# Patient Record
Sex: FEMALE
[De-identification: names, ages, dates, MRNs, and addresses within clinical notes are randomized per-mention and may not be internally consistent; named-entity substitution may affect disease eponyms.]

---

## 2022-09-26 ENCOUNTER — APPOINTMENT (OUTPATIENT)
Dept: HEART AND VASCULAR | Facility: CLINIC | Age: 32
End: 2022-09-26

## 2022-09-26 PROBLEM — Z00.00 ENCOUNTER FOR PREVENTIVE HEALTH EXAMINATION: Status: ACTIVE | Noted: 2022-09-26

## 2023-06-12 ENCOUNTER — OFFICE (OUTPATIENT)
Dept: URBAN - METROPOLITAN AREA CLINIC 28 | Facility: CLINIC | Age: 33
Setting detail: OPHTHALMOLOGY
End: 2023-06-12
Payer: COMMERCIAL

## 2023-06-12 ENCOUNTER — RX ONLY (RX ONLY)
Age: 33
End: 2023-06-12

## 2023-06-12 DIAGNOSIS — H01.004: ICD-10-CM

## 2023-06-12 DIAGNOSIS — H01.001: ICD-10-CM

## 2023-06-12 DIAGNOSIS — H10.45: ICD-10-CM

## 2023-06-12 PROCEDURE — 92004 COMPRE OPH EXAM NEW PT 1/>: CPT | Performed by: OPHTHALMOLOGY

## 2023-06-12 ASSESSMENT — TONOMETRY
OS_IOP_MMHG: 14
OD_IOP_MMHG: 15

## 2023-06-12 ASSESSMENT — LID EXAM ASSESSMENTS
OD_BLEPHARITIS: RUL 1+
OS_BLEPHARITIS: LUL 1+

## 2023-06-12 ASSESSMENT — CONFRONTATIONAL VISUAL FIELD TEST (CVF)
OD_FINDINGS: FULL
OS_FINDINGS: FULL

## 2023-06-13 PROBLEM — H10.45 ALLERGIC CONJUNCTIVITIS: Status: ACTIVE | Noted: 2023-06-12

## 2023-06-13 ASSESSMENT — VISUAL ACUITY
OS_BCVA: 20/20
OD_BCVA: 20/20

## 2023-09-10 NOTE — ASSESSMENT
[FreeTextEntry1] : DIONICIO HURST is a 33 year old female with left knee pain. I discussed with the patient that their symptoms, signs, and imaging are most consistent with ***. We reviewed the natural history of this condition and treatment options ranging from conservative measures (activity modification, physical therapy, icing, oral anti-inflammatory and/or analgesic medications, steroid injection, HA gel injections, PRP injections) to surgical management. We agreed on the following plan:   XR taken and reviewed with patient today. Activity modification: low impact aerobic activity (stationary bike, elliptical, swimming) Recommend 150 min per week of moderate intensity aerobic activity Start Home Exercises for knee conditioning. Demonstration, resistance bands and handout provided. Physical therapy. Referral provided. Medication:    prescription provided. Advanced imaging: consider MRI if no symptomatic improvement Follow up in 6-8 weeks.

## 2023-09-10 NOTE — HISTORY OF PRESENT ILLNESS
[de-identified] : DIONICIO HURST is a 33 year old female   who presents with left knee pain. States the onset of pain was No antecedent trauma or injury. Has not experienced previously. Pain is Pain is nonradiating. There is associated There is no associated swelling, stiffness, numbness, paraesthesia or weakness. Exacerbating factors are standing, walking for prolonged periods, climbing stairs, descending stairs, rising from seated position. Has tried Patient ambulates independently. Exercise: Has not tried PT Employment: Lives

## 2023-09-20 ENCOUNTER — APPOINTMENT (OUTPATIENT)
Dept: ORTHOPEDIC SURGERY | Facility: CLINIC | Age: 33
End: 2023-09-20

## 2024-01-02 ENCOUNTER — APPOINTMENT (OUTPATIENT)
Dept: OTOLARYNGOLOGY | Facility: CLINIC | Age: 34
End: 2024-01-02